# Patient Record
Sex: MALE | Race: WHITE | NOT HISPANIC OR LATINO | URBAN - METROPOLITAN AREA
[De-identification: names, ages, dates, MRNs, and addresses within clinical notes are randomized per-mention and may not be internally consistent; named-entity substitution may affect disease eponyms.]

---

## 2017-10-15 ENCOUNTER — CHARTING TRANS (OUTPATIENT)
Dept: URGENT CARE | Age: 57
End: 2017-10-15

## 2018-11-28 VITALS
HEART RATE: 78 BPM | DIASTOLIC BLOOD PRESSURE: 80 MMHG | SYSTOLIC BLOOD PRESSURE: 122 MMHG | RESPIRATION RATE: 16 BRPM | TEMPERATURE: 97.1 F | OXYGEN SATURATION: 98 %

## 2019-01-24 ENCOUNTER — WORKER'S COMP (OUTPATIENT)
Dept: OCCUPATIONAL MEDICINE | Age: 59
End: 2019-01-24

## 2019-01-24 DIAGNOSIS — S16.1XXA STRAIN OF NECK MUSCLE, INITIAL ENCOUNTER: Primary | ICD-10-CM

## 2019-01-24 DIAGNOSIS — Z02.71 ISSUE OF MEDICAL CERTIFICATE FOR DISABILITY EXAMINATION: ICD-10-CM
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
## 2019-01-24 PROCEDURE — 99203 OFFICE O/P NEW LOW 30 MIN: CPT | Performed by: PHYSICIAN ASSISTANT

## 2019-01-25 VITALS
TEMPERATURE: 97.1 F | RESPIRATION RATE: 20 BRPM | DIASTOLIC BLOOD PRESSURE: 86 MMHG | SYSTOLIC BLOOD PRESSURE: 144 MMHG | HEART RATE: 88 BPM

## 2024-10-07 ENCOUNTER — OFFICE VISIT (OUTPATIENT)
Dept: FAMILY MEDICINE CLINIC | Facility: CLINIC | Age: 64
End: 2024-10-07
Payer: COMMERCIAL

## 2024-10-07 VITALS
TEMPERATURE: 97 F | HEART RATE: 77 BPM | BODY MASS INDEX: 27.77 KG/M2 | RESPIRATION RATE: 16 BRPM | OXYGEN SATURATION: 99 % | DIASTOLIC BLOOD PRESSURE: 70 MMHG | WEIGHT: 205 LBS | SYSTOLIC BLOOD PRESSURE: 120 MMHG | HEIGHT: 72 IN

## 2024-10-07 DIAGNOSIS — Z12.5 PROSTATE CANCER SCREENING: ICD-10-CM

## 2024-10-07 DIAGNOSIS — Z00.00 WELLNESS EXAMINATION: Primary | ICD-10-CM

## 2024-10-07 DIAGNOSIS — Z12.11 COLON CANCER SCREENING: ICD-10-CM

## 2024-10-07 DIAGNOSIS — Z13.220 LIPID SCREENING: ICD-10-CM

## 2024-10-07 DIAGNOSIS — R73.09 ELEVATED GLUCOSE: ICD-10-CM

## 2024-10-07 DIAGNOSIS — L40.9 PSORIASIS: ICD-10-CM

## 2024-10-07 RX ORDER — TRIAMCINOLONE ACETONIDE 1 MG/G
CREAM TOPICAL 2 TIMES DAILY PRN
Qty: 60 G | Refills: 3 | Status: SHIPPED | OUTPATIENT
Start: 2024-10-07

## 2024-10-07 NOTE — PROGRESS NOTES
Devonte Espino is a 64 year old male.   Chief Complaint   Patient presents with    Physical     Had an eye exam and they said that he is prob diabetic.      HPI:1.  Cpx: new to clinic, went to eye doc and told that might be a diabetic due to eye exam findings    Past Medical History:    Psoriasis       History reviewed. No pertinent surgical history.    Family History   Problem Relation Age of Onset    Diabetes Father     Cancer Mother         leukemia       Social History:    Social History     Socioeconomic History    Marital status:    Tobacco Use    Smoking status: Never    Smokeless tobacco: Never   Vaping Use    Vaping status: Never Used       ALLERGIES:  No Known Allergies   CURRENT MEDS:  Current Outpatient Medications   Medication Sig Dispense Refill    triamcinolone 0.1 % External Cream Apply topically 2 (two) times daily as needed. 60 g 3        REVIEW OF SYSTEMS:   GENERAL HEALTH: Feels well overall  SKIN: see pmh  EYES: No visual complaints or deficits  HEENT: Denies nasal congestion, sinus pain or sore throat; hearing loss negative  RESPIRATORY: Denies shortness of breath, wheezing or cough   CARDIOVASCULAR: Denies chest pain or SANTOS; no palpitations   GI: Denies nausea, vomiting, constipation, diarrhea; no rectal bleeding; no heartburn  GENITAL/: No urinary symptoms  MUSCULOSKELETAL: No joint complaints upper or lower extremities  NEURO: No sensory or motor complaint  PSYCHE: No symptoms of depression or anxiety  HEMATOLOGY: No easy bleeding, bruising,   ENDOCRINE: No thyroid symptoms      PHYSICAL EXAM:   GENERAL: Well developed, well nourished, in no apparent distress, A&O X 3  SKIN: bilateral arms psoriatic lesions  EYES: PERRL, EOMI, sclera anicteric, conjunctiva normal  HEENT: Normocephalic; normal nose, pharynx and TM's  NECK: supple; Full range of motion , no JVD, thyroid not enlarged, no carotid bruits  RESPIRATORY: Bilaterally  clear to auscultation, no rales, no wheezing, good A/E  bilaterally  CARDIOVASCULAR: S1, S2 normal, RRR; no S3, no S4; no click; no murmur  ABDOMEN:  Soft, nondistended; no HSM; no masses; nontender, no guarding  GENITAL/: deferred   RECTAL:defered  LYMPHATIC: No lymphadenopathy  MUSCULOSKELETAL: Full painless ROM of U/E and L/E joints,no joint effusion  EXTREMITIES: No cyanosis, clubbing or edema, peripheral pulses intact  NEUROLOGIC:Motor power 5/5 all over, Cranial nerves 2-12: unremarcable; no sensory deficits  PSYCHIATRIC: Alert and oriented x 3; affect appropriate    ASSESSMENT/PLAN:     Diagnoses and all orders for this visit:    Wellness examination  -     Comp Metabolic Panel (14); Future  -     Lipid Panel; Future  -     Hemoglobin A1C; Future    Elevated glucose  -     Hemoglobin A1C; Future  -     OFFICE/OUTPT VISIT,[facility]    Colon cancer screening  -     COLONOSCOPY SCREENING - REFERRAL  -     COLOGUARD COLON CANCER SCREENING (EXTERNAL)    Lipid screening  -     Lipid Panel; Future    Prostate cancer screening  -     PSA - Total [5363][Q]; Future    Psoriasis  -     Derm Referral - In Network  -     OFFICE/OUTPT VISIT,[facility] III    Other orders  -     triamcinolone 0.1 % External Cream; Apply topically 2 (two) times daily as needed.      Discussed preventaive care, fasting labs  Will give trial of triamcinolone for psorisis, referred to derm as kat. Pt was never fomally diagnosed with psoriasis so spend time discussing management and way to contain it.   Will check A1C due to reason for visit       The patient verbalizes understanding of diagnosis, treatment plan and agrees to the plan of care.

## 2024-10-08 LAB
ALBUMIN/GLOBULIN RATIO: 1.6 (CALC) (ref 1–2.5)
ALBUMIN: 4.5 G/DL (ref 3.6–5.1)
ALKALINE PHOSPHATASE: 70 U/L (ref 35–144)
ALT: 14 U/L (ref 9–46)
AST: 15 U/L (ref 10–35)
BILIRUBIN, TOTAL: 0.6 MG/DL (ref 0.2–1.2)
BUN: 18 MG/DL (ref 7–25)
CALCIUM: 9.7 MG/DL (ref 8.6–10.3)
CARBON DIOXIDE: 27 MMOL/L (ref 20–32)
CHLORIDE: 103 MMOL/L (ref 98–110)
CHOL/HDLC RATIO: 5.2 (CALC)
CHOLESTEROL, TOTAL: 214 MG/DL
CREATININE: 0.84 MG/DL (ref 0.7–1.35)
EGFR: 99 ML/MIN/1.73M2
GLOBULIN: 2.9 G/DL (CALC) (ref 1.9–3.7)
GLUCOSE: 191 MG/DL (ref 65–99)
HDL CHOLESTEROL: 41 MG/DL
HEMOGLOBIN A1C: 10.3 % OF TOTAL HGB
LDL-CHOLESTEROL: 136 MG/DL (CALC)
NON-HDL CHOLESTEROL: 173 MG/DL (CALC)
POTASSIUM: 4.3 MMOL/L (ref 3.5–5.3)
PROTEIN, TOTAL: 7.4 G/DL (ref 6.1–8.1)
PSA, TOTAL: 0.55 NG/ML
SODIUM: 139 MMOL/L (ref 135–146)
TRIGLYCERIDES: 231 MG/DL

## 2024-10-10 ENCOUNTER — TELEMEDICINE (OUTPATIENT)
Dept: FAMILY MEDICINE CLINIC | Facility: CLINIC | Age: 64
End: 2024-10-10
Payer: COMMERCIAL

## 2024-10-10 DIAGNOSIS — E11.9 NEW ONSET TYPE 2 DIABETES MELLITUS (HCC): Primary | ICD-10-CM

## 2024-10-10 PROCEDURE — 99214 OFFICE O/P EST MOD 30 MIN: CPT | Performed by: FAMILY MEDICINE

## 2024-10-10 RX ORDER — LISINOPRIL 2.5 MG/1
2.5 TABLET ORAL DAILY
Qty: 90 TABLET | Refills: 3 | Status: SHIPPED | OUTPATIENT
Start: 2024-10-10

## 2024-10-10 NOTE — PROGRESS NOTES
VIDEO VISIT    CHIEF COMPLAINT:  No chief complaint on file.      HISTORY OF PRESENT ILLNESS:  This is a 64 year old male who verbally consents to a video visit today for diabetes labs discussion.  Patient was able to review his labs and noticed that his sugars were elevated as wwell as a hemoglobin A1c.  Patient recognizes that he is now a diabetic and would like to discuss treatment moving forward.  Patient also states that he has an appointment with an eye specialist to treat the eye findings and if he should continue with his visit.    ALLERGIES:  Allergies[1]    CURRENT MEDICATIONS:  Current Outpatient Medications   Medication Sig Dispense Refill    metFORMIN 500 MG Oral Tab Take 1 tablet (500 mg total) by mouth daily with breakfast. 90 tablet 3    lisinopril 2.5 MG Oral Tab Take 1 tablet (2.5 mg total) by mouth daily. 90 tablet 3    triamcinolone 0.1 % External Cream Apply topically 2 (two) times daily as needed. 60 g 3       MEDICAL HISTORY:  Past Medical History:    Psoriasis     No past surgical history on file.  Family History   Problem Relation Age of Onset    Diabetes Father     Cancer Mother         leukemia     Family Status   Relation Status    Fa (Not Specified)    Mo (Not Specified)     Social History     Socioeconomic History    Marital status:    Tobacco Use    Smoking status: Never    Smokeless tobacco: Never   Vaping Use    Vaping status: Never Used       ROS:    GENERAL:  Denies fever or chills  RESPIRATORY:  Denies difficulty breathing  CARDIO:  Denies swelling of legs or chest pain with exertion  NEURO:  Denies recent falls or sudden changes of vision  PSYCH: Denies suicidal or homicidal ideations    VITALS:  No vitals were obtained by clinical staff during this visit.      PHYSICAL EXAM:    Physical exam is limited due to video visit.    Devonte Espino serves as a reliable historian.  Speech is clear and organized without difficulty speaking in full sentences.    No shortness of  breath or cough noted during our conversation.  Overall is feeling well    ASSESSMENT & PLAN:  New onset diabetic type II   Long discussion with patient regarding his labs and the findings.  Able to discuss in detail all his labs that were drawn including hemoglobin A1c CMP PSA.  Patient has a glucose of over 126 as well as a hemoglobin A1c of 10.3.  Patient understands he is a diabetic and treatment is needed.  Explained to patient the pathophysiology of diabetes and the logic behind how he is checked how often he is testing should be done and the logic behind therapies and interventions.  Given his eye findings will need to start on lisinopril 2.5 mg daily and metformin 500 mg daily.  Patient states that he has been on metformin in the past and had issues with diarrhea but not sure at what dose.  The patient cannot tolerated to let us know we will make adjustments.  Will have to revisit patient in 3 months to recheck labs and see how he responds to the current therapies.  We also can introduce him to a dietitian so he can make adjustments to his diet and exercise.  Patient was encouraged to see the eye doctor in order to correct his vision issues to tomorrow.  Will follow-up in 3 months.       [1] No Known Allergies

## 2024-11-25 ENCOUNTER — TELEPHONE (OUTPATIENT)
Facility: LOCATION | Age: 64
End: 2024-11-25

## 2024-11-25 ENCOUNTER — TELEPHONE (OUTPATIENT)
Dept: FAMILY MEDICINE CLINIC | Facility: CLINIC | Age: 64
End: 2024-11-25

## 2024-11-25 ENCOUNTER — OFFICE VISIT (OUTPATIENT)
Dept: FAMILY MEDICINE CLINIC | Facility: CLINIC | Age: 64
End: 2024-11-25
Payer: COMMERCIAL

## 2024-11-25 VITALS
HEART RATE: 88 BPM | DIASTOLIC BLOOD PRESSURE: 60 MMHG | RESPIRATION RATE: 16 BRPM | BODY MASS INDEX: 28.17 KG/M2 | SYSTOLIC BLOOD PRESSURE: 120 MMHG | OXYGEN SATURATION: 97 % | HEIGHT: 72 IN | TEMPERATURE: 98 F | WEIGHT: 208 LBS

## 2024-11-25 DIAGNOSIS — L03.314 CELLULITIS OF RIGHT GROIN: Primary | ICD-10-CM

## 2024-11-25 DIAGNOSIS — L40.9 PSORIASIS: ICD-10-CM

## 2024-11-25 PROCEDURE — 99213 OFFICE O/P EST LOW 20 MIN: CPT | Performed by: NURSE PRACTITIONER

## 2024-11-25 RX ORDER — CEPHALEXIN 500 MG/1
500 CAPSULE ORAL 2 TIMES DAILY
Qty: 14 CAPSULE | Refills: 0 | Status: SHIPPED | OUTPATIENT
Start: 2024-11-25 | End: 2024-12-02

## 2024-11-25 NOTE — TELEPHONE ENCOUNTER
Patient was seen today with his PCP for Cellulitis of right groin and was referred to us.  Patient has been schedule for 1/15/24 but needs to be seen sooner.    Call back number is [phone]

## 2024-11-25 NOTE — TELEPHONE ENCOUNTER
RN was able to find the surgical order placed- it was not placed as referral but as other orders    Reordered as areferral

## 2024-11-25 NOTE — TELEPHONE ENCOUNTER
Patient was advised that he needs to contact insurance to find in network Jakub, Provider    Patient states the first available for any of the surgeons was 1/15/24.  RN states that while he is speaking to insurance about dermatology- ask about general surgeron in network to see if they would have openings. If he finds a new provider- let us know so we can update referral

## 2024-11-25 NOTE — TELEPHONE ENCOUNTER
Pt thought he was supposed to get a referral for surgery AND dermatology.  Pt states he only got one for dermatology.  Please advise.  Thank you!

## 2024-11-25 NOTE — TELEPHONE ENCOUNTER
Devonte can see any provider within [facility] group    Patient will need to call insurance/go to insurance company's website to determine who is within his network

## 2024-11-25 NOTE — TELEPHONE ENCOUNTER
Pt states that he can not get into surgeon he was referred to until 1/15/25 and dermatologist he was referred to does not accept his insurance.  Can you suggest alternative doctors for referrals?   Please advise.  Thank you!

## 2024-11-25 NOTE — PROGRESS NOTES
CHIEF COMPLAINT:    Chief Complaint   Patient presents with    Derm Problem     Ingrown hair in pubic region       HISTORY OF PRESENT ILLNESS:    65yo m  History of psoriasis  Ingrown hair  Began 3 months   Painful  Increasing in size  Denies fevers or chills  Has not applied cream or used warm compresses  Discussed warm compress and begin antibiotics  Follow with general surgery for incision and drainage    Devonte also reports worsening skin lesions, history of psoriasis  No significant improvement with use of triamcinolone  Agreeable to dermatology referral    ALLERGIES:  Allergies[1]    CURRENT MEDICATIONS:  Current Outpatient Medications   Medication Sig Dispense Refill    metFORMIN 500 MG Oral Tab Take 1 tablet (500 mg total) by mouth daily with breakfast. 90 tablet 3    lisinopril 2.5 MG Oral Tab Take 1 tablet (2.5 mg total) by mouth daily. 90 tablet 3    triamcinolone 0.1 % External Cream Apply topically 2 (two) times daily as needed. 60 g 3       MEDICAL HISTORY:  Past Medical History:    Psoriasis     No past surgical history on file.  Family History   Problem Relation Age of Onset    Diabetes Father     Cancer Mother         leukemia     Family Status   Relation Status    Fa (Not Specified)    Mo (Not Specified)     Social History     Socioeconomic History    Marital status:    Tobacco Use    Smoking status: Never    Smokeless tobacco: Never   Vaping Use    Vaping status: Never Used       ROS:  GENERAL:  Denies recorded temperatures greater than 100.5F  RESPIRATORY:  Denies difficulty breathing  CARDIAC:  Denies chest pain with exertion    VITALS:   /60   Pulse 88   Temp 97.5 °F (36.4 °C) (Temporal)   Resp 16   Ht 6' (1.829 m)   Wt 208 lb (94.3 kg)   SpO2 97%   BMI 28.21 kg/m²     Reviewed by Bridgette Bernstein MS, APRN, FNP-BC    PHYSICAL EXAM:    Physical Exam  Constitutional:       Appearance: Normal appearance.   Cardiovascular:      Rate and Rhythm: Normal rate.   Pulmonary:       Effort: Pulmonary effort is normal.   Skin:     General: Skin is warm.      Findings: Erythema present.      Comments: Erythematous patch to right groin, extending into perineal region, indurated and tender.    Erythematous lesions with white/grey scales to center of bilateral forearms and left wrist /left palm   Neurological:      Mental Status: He is alert.   Psychiatric:         Mood and Affect: Mood normal.         Behavior: Behavior normal.         Thought Content: Thought content normal.         Judgment: Judgment normal.       ASSESSMENT & PLAN:    1. Cellulitis of right groin  - cephALEXin 500 MG Oral Cap; Take 1 capsule (500 mg total) by mouth 2 (two) times daily for 7 days.  Dispense: 14 capsule; Refill: 0  - Refer to General Surgery  Follow-up with general surgeon    2. Psoriasis  - Derm Referral - In Network  Follow-up with dermatology         [1] No Known Allergies

## 2024-11-26 NOTE — TELEPHONE ENCOUNTER
Spoke to patient and rescheduled to next week with Dr. Leyva.     Future Appointments   Date Time Provider Department Center   12/3/2024  9:00 AM Latasha Leyva MD [id number]

## 2024-11-27 ENCOUNTER — TELEPHONE (OUTPATIENT)
Facility: LOCATION | Age: 64
End: 2024-11-27

## 2024-11-27 ENCOUNTER — PATIENT MESSAGE (OUTPATIENT)
Facility: LOCATION | Age: 64
End: 2024-11-27

## 2024-11-27 ENCOUNTER — OFFICE VISIT (OUTPATIENT)
Facility: LOCATION | Age: 64
End: 2024-11-27
Payer: COMMERCIAL

## 2024-11-27 VITALS
WEIGHT: 208 LBS | BODY MASS INDEX: 28.17 KG/M2 | OXYGEN SATURATION: 97 % | TEMPERATURE: 99 F | SYSTOLIC BLOOD PRESSURE: 135 MMHG | HEART RATE: 90 BPM | DIASTOLIC BLOOD PRESSURE: 76 MMHG | HEIGHT: 72 IN

## 2024-11-27 DIAGNOSIS — L02.91 ABSCESS: Primary | ICD-10-CM

## 2024-11-27 PROCEDURE — 87205 SMEAR GRAM STAIN: CPT | Performed by: STUDENT IN AN ORGANIZED HEALTH CARE EDUCATION/TRAINING PROGRAM

## 2024-11-27 PROCEDURE — 87075 CULTR BACTERIA EXCEPT BLOOD: CPT | Performed by: STUDENT IN AN ORGANIZED HEALTH CARE EDUCATION/TRAINING PROGRAM

## 2024-11-27 PROCEDURE — 87076 CULTURE ANAEROBE IDENT EACH: CPT | Performed by: STUDENT IN AN ORGANIZED HEALTH CARE EDUCATION/TRAINING PROGRAM

## 2024-11-27 PROCEDURE — 87070 CULTURE OTHR SPECIMN AEROBIC: CPT | Performed by: STUDENT IN AN ORGANIZED HEALTH CARE EDUCATION/TRAINING PROGRAM

## 2024-11-27 PROCEDURE — 99203 OFFICE O/P NEW LOW 30 MIN: CPT | Performed by: STUDENT IN AN ORGANIZED HEALTH CARE EDUCATION/TRAINING PROGRAM

## 2024-11-27 PROCEDURE — 10060 I&D ABSCESS SIMPLE/SINGLE: CPT | Performed by: STUDENT IN AN ORGANIZED HEALTH CARE EDUCATION/TRAINING PROGRAM

## 2024-11-27 NOTE — TELEPHONE ENCOUNTER
Future Appointments   Date Time Provider Department Center   11/27/2024 10:15 AM Latasha Leyva MD [id number]

## 2024-11-27 NOTE — TELEPHONE ENCOUNTER
Patient calling back stating the area has gotten bigger and wants to know if he can be seen sooner than 12/3/24.    Call back number is [phone]

## 2024-11-27 NOTE — H&P
Patient ID: Devonte Espino is a 64 year old male.    Chief Complaint   Patient presents with    New Patient     NP - Cellulitis of right groin, pt states it is spreading rapidly, no other symptoms.       HPI: Devonte Espino is a 64 year old male presents to clinic for evaluation.  Patient has noticed a lump in his right groin for 3 months or so.  Recently, lump has gotten more painful and bigger with surrounding redness.  Patient went to his PCP who referred him to general surgery for further evaluation.  Today, patient reports some subjective fevers and chills.  He denies any drainage from his wound.  He denies any other symptoms.    Workup: None      Past Medical History  Past Medical History:    Psoriasis       Past Surgical History  History reviewed. No pertinent surgical history.    Medications  Current Outpatient Medications   Medication Sig Dispense Refill    cephALEXin 500 MG Oral Cap Take 1 capsule (500 mg total) by mouth 2 (two) times daily for 7 days. 14 capsule 0    metFORMIN 500 MG Oral Tab Take 1 tablet (500 mg total) by mouth daily with breakfast. 90 tablet 3    lisinopril 2.5 MG Oral Tab Take 1 tablet (2.5 mg total) by mouth daily. 90 tablet 3    triamcinolone 0.1 % External Cream Apply topically 2 (two) times daily as needed. (Patient not taking: Reported on 11/27/2024) 60 g 3       Allergies  Allergies[1]    Social History  History   Smoking Status    Never   Smokeless Tobacco    Never     History   Alcohol use Not on file     History   Drug Use Not on file       Family History  Family History   Problem Relation Age of Onset    Diabetes Father     Cancer Mother         leukemia       Review of Systems  Review of Systems   Constitutional:  Positive for chills and fever.   Respiratory: Negative.     Cardiovascular: Negative.    Gastrointestinal: Negative.        Exam  Vitals:    11/27/24 1006   BP: 135/76   Pulse: 90   Temp: 98.5 °F (36.9 °C)     Physical Exam  Exam conducted with a chaperone  present.   Constitutional:       Appearance: Normal appearance.   Cardiovascular:      Rate and Rhythm: Normal rate.   Pulmonary:      Effort: Pulmonary effort is normal.   Genitourinary:      Musculoskeletal:         General: Normal range of motion.   Skin:     General: Skin is warm and dry.   Neurological:      Mental Status: He is alert and oriented to person, place, and time.           Assessment/Plan  Assessment   Problem List Items Addressed This Visit    None  Visit Diagnoses       Abscess    -  Primary    Relevant Orders    ANAEROBIC AND AEROBIC CULTURE [4446] [Q]            Devonte Espino is a 64 year old male with right groin abscess    Patient underwent I&D of the right groin abscess, see separate procedure note for details  Cultures were taken  Patient should continue taking the antibiotics prescribed, Keflex  Patient should remove packing from the abscess in 1 day  He can then dressed with gauze as needed  Patient will follow-up in 2 weeks for wound check    He is to call the office for any questions or concerns      Latasha Leyva MD  General Surgery  [facility]     CC:  Jabari Torres MD         [1] No Known Allergies

## 2024-11-29 NOTE — PROCEDURES
OPERATIVE NOTE    Devonte Espino Location: [facility] [id number] MRN [id number]   Admission Date (Not on file) Operation Date 11/29/2024   Attending Physician No att. providers found Operating Physician Latasha Leyva MD     PREOPERATIVE DIAGNOSIS  Right groin abscess    POSTOPERATIVE DIAGNOSIS  Right groin abscess    PROCEDURE PERFORMED  Incision and drainage right groin abscess    SURGEON  Latasha Leyva MD    ASSISTANTS  None    ANESTHESIA  Local    INDICATIONS  This is a 64-year-old gentleman who presented to the clinic with swelling and redness in his right groin.  On physical exam, patient had approximately 3 cm area of fluctuance consistent with an abscess.  Drainage was indicated.    FINDINGS   Right groin abscess with purulent drainage    FINDINGS/DESCRIPTION OF PROCEDURE  After informed and written consent, patient was positioned on the procedure table. Time out was performed confirming patient, procedure, and site. Area was prepped with Betadine.  Local was infiltrated in the tissues.  Using an 11 blade, incision was made with immediate return of purulent material.  A sample was taken for culture and sent as right groin abscess.  As much purulence was drained from the area.  Wound was then cleaned and packed with iodoform packing.  Wound was then dressed with gauze, ABD pad, and tape.  Patient tolerated the procedure well.    SPECIMENS REMOVED  Right groin abscess    ESTIMATED BLOOD LOSS  2 ml    COMPLICATIONS  None     Latasha Leyva MD

## 2024-12-02 ENCOUNTER — TELEPHONE (OUTPATIENT)
Facility: LOCATION | Age: 64
End: 2024-12-02

## 2024-12-02 NOTE — TELEPHONE ENCOUNTER
Patient calling because he was told he would be called today about the antibiotic. Says he finished the original prescription that was prescribed by his primary care provider.   He's still draining a little, it's a little goopy.    Please advise  Best callback number is [phone]

## 2024-12-02 NOTE — TELEPHONE ENCOUNTER
Patient advised Augmentin was prescribed and sent to [facility] and to notify our office if he experiences worsening symptoms at the wound site.  Patient verbalized understanding.

## 2024-12-18 ENCOUNTER — OFFICE VISIT (OUTPATIENT)
Dept: SURGERY | Facility: CLINIC | Age: 64
End: 2024-12-18
Payer: COMMERCIAL

## 2024-12-18 VITALS
DIASTOLIC BLOOD PRESSURE: 70 MMHG | SYSTOLIC BLOOD PRESSURE: 130 MMHG | HEART RATE: 68 BPM | OXYGEN SATURATION: 99 % | TEMPERATURE: 98 F

## 2024-12-18 DIAGNOSIS — L02.214 GROIN ABSCESS: Primary | ICD-10-CM

## 2024-12-18 PROCEDURE — 99213 OFFICE O/P EST LOW 20 MIN: CPT | Performed by: STUDENT IN AN ORGANIZED HEALTH CARE EDUCATION/TRAINING PROGRAM

## 2024-12-18 NOTE — PROGRESS NOTES
Patient ID: Devonte Espino is a 64 year old male.    Chief Complaint   Patient presents with    [facility]     EP- 3 week f/u for I&D of Right Groin Abscess- wound check        HPI: Devonte Espino is a 64 year old male presents to clinic for follow-up.  Since last clinic appointment, patient has been improving.  He did obtain the new antibiotic and finished a course.  He states that his abscess has completely resolved.  He denies seeing any drainage.  He denies any fevers or chills.    Workup: None      Past Medical History  Past Medical History:    Diabetes (HCC)    Psoriasis       Past Surgical History  History reviewed. No pertinent surgical history.    Medications  Current Outpatient Medications   Medication Sig Dispense Refill    metFORMIN 500 MG Oral Tab Take 1 tablet (500 mg total) by mouth daily with breakfast. 90 tablet 3    lisinopril 2.5 MG Oral Tab Take 1 tablet (2.5 mg total) by mouth daily. 90 tablet 3    triamcinolone 0.1 % External Cream Apply topically 2 (two) times daily as needed. (Patient not taking: Reported on 11/27/2024) 60 g 3       Allergies  Allergies[1]    Social History  History   Smoking Status    Never   Smokeless Tobacco    Never     History   Alcohol use Not on file     History   Drug Use Not on file       Family History  Family History   Problem Relation Age of Onset    Diabetes Father     Cancer Mother         leukemia       Review of Systems  Review of Systems   Constitutional: Negative.    Respiratory: Negative.     Cardiovascular: Negative.    Gastrointestinal: Negative.        Exam  Vitals:    12/18/24 0828   BP: 130/70   Pulse: 68   Temp: 98.2 °F (36.8 °C)     Physical Exam  Constitutional:       Appearance: Normal appearance.   Cardiovascular:      Rate and Rhythm: Normal rate.   Pulmonary:      Effort: Pulmonary effort is normal.   Genitourinary:      Musculoskeletal:         General: Normal range of motion.   Skin:     General: Skin is warm and dry.   Neurological:       Mental Status: He is alert and oriented to person, place, and time.           Assessment/Plan  Assessment   Problem List Items Addressed This Visit    None  Visit Diagnoses       Groin abscess    -  Primary            Devonte Espino is a 64 year old male with a right groin abscess    On physical exam, wound is healing well, no erythema or drainage  Patient has finished a course of antibiotics  He can shower as tolerated  He can resume all normal activities  Patient can follow-up as needed  If he sees a recurrent lump or has any recurrent symptoms, he should reach out to my office as he may need a repeat procedure    Patient can call the office for any questions or concerns    Thank you for letting me participate in the care of this patient      Latasha Leyva MD  General Surgery  [facility]     CC:  Jabari Torres MD         [1] No Known Allergies

## 2025-08-28 ENCOUNTER — TELEPHONE (OUTPATIENT)
Dept: FAMILY MEDICINE CLINIC | Facility: CLINIC | Age: 65
End: 2025-08-28

## (undated) NOTE — LETTER
Patient Name: Devonte Espino        : 1960       Medical Record #: [id number]    CONSENT FOR PROCEDURES/SEDATION    Date: 2024       Time: 10:33 AM        1. I authorize the performance upon Devonte Espino the following:    __________________________________________________________________________    2. I authorize Dr. Leyva (and whomever is designated as the doctor’s assistant), to perform the above mentioned procedures.    3. If any unforeseen conditions arise during this procedure calling for additional procedures, operations, or medications (including anesthesia and blood transfusion), I  further request and authorize the doctor to do whatever he/she deems advisable in my interest.    4. I consent to the taking and reproduction of any photographs in the course of this procedure for professional purposes.    5. I consent to the administration of such sedation as may be considered necessary or advisable by the physician responsible for this service, with the exception of  _____________________________.    6. I have been informed by my doctor of the nature and purpose of this procedure/sedation, possible alternative methods of treatment, risk involved and possible complications.      Signature of Patient:  ___________________________    Signature of person authorized to consent for patient: Relationship to patient:  ___________________________    ___________________    Witness: ____________________     Date: ______________    Provider: ____________________     Date: ______________